# Patient Record
Sex: FEMALE | Race: WHITE | NOT HISPANIC OR LATINO | URBAN - METROPOLITAN AREA
[De-identification: names, ages, dates, MRNs, and addresses within clinical notes are randomized per-mention and may not be internally consistent; named-entity substitution may affect disease eponyms.]

---

## 2023-06-20 ENCOUNTER — EMERGENCY (EMERGENCY)
Facility: HOSPITAL | Age: 53
LOS: 1 days | Discharge: HOME | End: 2023-06-20
Attending: EMERGENCY MEDICINE
Payer: MEDICARE

## 2023-06-20 VITALS
DIASTOLIC BLOOD PRESSURE: 78 MMHG | WEIGHT: 209 LBS | HEART RATE: 76 BPM | TEMPERATURE: 98 F | SYSTOLIC BLOOD PRESSURE: 117 MMHG | OXYGEN SATURATION: 98 % | RESPIRATION RATE: 17 BRPM

## 2023-06-20 LAB
ALBUMIN SERPL ELPH-MCNC: 3.9 G/DL — SIGNIFICANT CHANGE UP (ref 3.3–5.2)
ALP SERPL-CCNC: 82 U/L — SIGNIFICANT CHANGE UP (ref 40–120)
ALT FLD-CCNC: 11 U/L — SIGNIFICANT CHANGE UP
ANION GAP SERPL CALC-SCNC: 13 MMOL/L — SIGNIFICANT CHANGE UP (ref 5–17)
APTT BLD: 35.4 SEC — SIGNIFICANT CHANGE UP (ref 27.5–35.5)
AST SERPL-CCNC: 13 U/L — SIGNIFICANT CHANGE UP
BASOPHILS # BLD AUTO: 0.05 K/UL — SIGNIFICANT CHANGE UP (ref 0–0.2)
BASOPHILS NFR BLD AUTO: 0.7 % — SIGNIFICANT CHANGE UP (ref 0–2)
BILIRUB SERPL-MCNC: 0.2 MG/DL — LOW (ref 0.4–2)
BUN SERPL-MCNC: 8.8 MG/DL — SIGNIFICANT CHANGE UP (ref 8–20)
CALCIUM SERPL-MCNC: 9 MG/DL — SIGNIFICANT CHANGE UP (ref 8.4–10.5)
CHLORIDE SERPL-SCNC: 103 MMOL/L — SIGNIFICANT CHANGE UP (ref 96–108)
CO2 SERPL-SCNC: 23 MMOL/L — SIGNIFICANT CHANGE UP (ref 22–29)
CREAT SERPL-MCNC: 0.57 MG/DL — SIGNIFICANT CHANGE UP (ref 0.5–1.3)
EGFR: 110 ML/MIN/1.73M2 — SIGNIFICANT CHANGE UP
EOSINOPHIL # BLD AUTO: 0.06 K/UL — SIGNIFICANT CHANGE UP (ref 0–0.5)
EOSINOPHIL NFR BLD AUTO: 0.8 % — SIGNIFICANT CHANGE UP (ref 0–6)
GLUCOSE SERPL-MCNC: 95 MG/DL — SIGNIFICANT CHANGE UP (ref 70–99)
HCG SERPL-ACNC: <4 MIU/ML — SIGNIFICANT CHANGE UP
HCT VFR BLD CALC: 36.2 % — SIGNIFICANT CHANGE UP (ref 34.5–45)
HGB BLD-MCNC: 12.3 G/DL — SIGNIFICANT CHANGE UP (ref 11.5–15.5)
IMM GRANULOCYTES NFR BLD AUTO: 0.3 % — SIGNIFICANT CHANGE UP (ref 0–0.9)
INR BLD: 1.14 RATIO — SIGNIFICANT CHANGE UP (ref 0.88–1.16)
LYMPHOCYTES # BLD AUTO: 2.37 K/UL — SIGNIFICANT CHANGE UP (ref 1–3.3)
LYMPHOCYTES # BLD AUTO: 32.7 % — SIGNIFICANT CHANGE UP (ref 13–44)
MCHC RBC-ENTMCNC: 31.1 PG — SIGNIFICANT CHANGE UP (ref 27–34)
MCHC RBC-ENTMCNC: 34 GM/DL — SIGNIFICANT CHANGE UP (ref 32–36)
MCV RBC AUTO: 91.6 FL — SIGNIFICANT CHANGE UP (ref 80–100)
MONOCYTES # BLD AUTO: 0.44 K/UL — SIGNIFICANT CHANGE UP (ref 0–0.9)
MONOCYTES NFR BLD AUTO: 6.1 % — SIGNIFICANT CHANGE UP (ref 2–14)
NEUTROPHILS # BLD AUTO: 4.31 K/UL — SIGNIFICANT CHANGE UP (ref 1.8–7.4)
NEUTROPHILS NFR BLD AUTO: 59.4 % — SIGNIFICANT CHANGE UP (ref 43–77)
NT-PROBNP SERPL-SCNC: 30 PG/ML — SIGNIFICANT CHANGE UP (ref 0–300)
PLATELET # BLD AUTO: 333 K/UL — SIGNIFICANT CHANGE UP (ref 150–400)
POTASSIUM SERPL-MCNC: 3.9 MMOL/L — SIGNIFICANT CHANGE UP (ref 3.5–5.3)
POTASSIUM SERPL-SCNC: 3.9 MMOL/L — SIGNIFICANT CHANGE UP (ref 3.5–5.3)
PROT SERPL-MCNC: 7.3 G/DL — SIGNIFICANT CHANGE UP (ref 6.6–8.7)
PROTHROM AB SERPL-ACNC: 13.2 SEC — SIGNIFICANT CHANGE UP (ref 10.5–13.4)
RBC # BLD: 3.95 M/UL — SIGNIFICANT CHANGE UP (ref 3.8–5.2)
RBC # FLD: 13.3 % — SIGNIFICANT CHANGE UP (ref 10.3–14.5)
SODIUM SERPL-SCNC: 139 MMOL/L — SIGNIFICANT CHANGE UP (ref 135–145)
TROPONIN T SERPL-MCNC: <0.01 NG/ML — SIGNIFICANT CHANGE UP (ref 0–0.06)
WBC # BLD: 7.25 K/UL — SIGNIFICANT CHANGE UP (ref 3.8–10.5)
WBC # FLD AUTO: 7.25 K/UL — SIGNIFICANT CHANGE UP (ref 3.8–10.5)

## 2023-06-20 PROCEDURE — 93005 ELECTROCARDIOGRAM TRACING: CPT

## 2023-06-20 PROCEDURE — 85025 COMPLETE CBC W/AUTO DIFF WBC: CPT

## 2023-06-20 PROCEDURE — 99285 EMERGENCY DEPT VISIT HI MDM: CPT

## 2023-06-20 PROCEDURE — 93010 ELECTROCARDIOGRAM REPORT: CPT

## 2023-06-20 PROCEDURE — 71045 X-RAY EXAM CHEST 1 VIEW: CPT | Mod: 26

## 2023-06-20 PROCEDURE — 84484 ASSAY OF TROPONIN QUANT: CPT

## 2023-06-20 PROCEDURE — 36415 COLL VENOUS BLD VENIPUNCTURE: CPT

## 2023-06-20 PROCEDURE — 84702 CHORIONIC GONADOTROPIN TEST: CPT

## 2023-06-20 PROCEDURE — 71045 X-RAY EXAM CHEST 1 VIEW: CPT

## 2023-06-20 PROCEDURE — 99285 EMERGENCY DEPT VISIT HI MDM: CPT | Mod: 25

## 2023-06-20 PROCEDURE — 80053 COMPREHEN METABOLIC PANEL: CPT

## 2023-06-20 PROCEDURE — 85610 PROTHROMBIN TIME: CPT

## 2023-06-20 PROCEDURE — 83880 ASSAY OF NATRIURETIC PEPTIDE: CPT

## 2023-06-20 PROCEDURE — 85730 THROMBOPLASTIN TIME PARTIAL: CPT

## 2023-06-20 NOTE — ED ADULT NURSE NOTE - NSFALLRISKINTERV_ED_ALL_ED

## 2023-06-20 NOTE — ED PROVIDER NOTE - PATIENT PORTAL LINK FT
You can access the FollowMyHealth Patient Portal offered by [facility] by registering at the following website: [email or web address]. By joining [employer]’s FollowMyHealth portal, you will also be able to view your health information using other applications (apps) compatible with our system.

## 2023-06-20 NOTE — ED PROVIDER NOTE - NSFOLLOWUPINSTRUCTIONS_ED_ALL_ED_FT
1. Return to ED for worsening, progressive or any other concerning symptoms   2. Follow up with your primary care doctor in 2-3days   3. Follow up with vascular specialist 1. Return to ED for worsening, progressive or any other concerning symptoms   2. Follow up with your primary care doctor in 2-3days   3. Follow up with vascular specialist    Chest Pain    Chest pain can be caused by many different conditions which may or may not be dangerous. Causes include heartburn, lung infections, heart attack, blood clot in lungs, skin infections, strain or damage to muscle, cartilage, or bones, etc. In addition to a history and physical examination, an electrocardiogram (ECG) or other lab tests may have been performed to determine the cause of your chest pain. Follow up with your primary care provider or with a cardiologist as instructed.     SEEK IMMEDIATE MEDICAL CARE IF YOU HAVE ANY OF THE FOLLOWING SYMPTOMS: worsening chest pain, coughing up blood, unexplained back/neck/jaw pain, severe abdominal pain, dizziness or lightheadedness, fainting, shortness of breath, sweaty or clammy skin, vomiting, or racing heart beat. These symptoms may represent a serious problem that is an emergency. Do not wait to see if the symptoms will go away. Get medical help right away. Call 911 and do not drive yourself to the hospital.

## 2023-06-20 NOTE — ED PROVIDER NOTE - CROS ED CARDIOVAS ALL NEG
- - - uncomplicated vaginal delivery, uncomplicated postpartum course, discharged home in stable condition

## 2023-06-20 NOTE — ED PROVIDER NOTE - CLINICAL SUMMARY MEDICAL DECISION MAKING FREE TEXT BOX
52 yo female presents with b/l LE swelling, exertional dyspnea, chest pain intermittent for past week. Will check labs, ekg, cxr. Monitor and reassess.

## 2023-06-20 NOTE — ED STATDOCS - PROGRESS NOTE DETAILS
Aashish JORDAN for Attending, Dr. Cleveland: 52 y/o female with PMHx of HTN presents with bilateral foot swelling for 1 week. Reports SOB on exertion. Denies SOB when laying down. Reports swelling developed in both feet at same time. Reports falling 1 month ago and fell again recently. Denies past history of similar symptoms. Denies recent travel or long car rides. Reports ambulating. Reports taking taking HTN meds daily. Will get EKG and patient will be placed in the main ED for complete evaluation by another provider.

## 2023-06-20 NOTE — ED PROVIDER NOTE - ATTENDING WITH...
[employer] Access Code: [email or web address]  Expires: 5/7/2017  7:54 PM    [employer]  A secure, online tool to manage your health information     [employer]’s [employer]® is a secure, online tool that connects you to your personalized health information from the privacy of your home -- day or night - making it very easy for you to manage your healthcare. Once the activation process is completed, you can even access your medical information using the [employer] mirna, which is available for free in the [employer] Mirna store or Google Play store.     [employer] provides the following levels of access (as shown below):   My Chart Features   [facility] Primary Care Doctor [facility]  Specialists [facility]  Urgent  Care Non-[facility]  Primary Care  Doctor   Email your healthcare team securely and privately 24/7 X X X X   Manage appointments: schedule your next appointment; view details of past/upcoming appointments X      Request prescription refills. X      View recent personal medical records, including lab and immunizations X X X X   View health record, including health history, allergies, medications X X X X   Read reports about your outpatient visits, procedures, consult and ER notes X X X X   See your discharge summary, which is a recap of your hospital and/or ER visit that includes your diagnosis, lab results, and care plan. X X       How to register for [employer]:  1. Go to  [email or web address].  2. Click on the Sign Up Now box, which takes you to the New Member Sign Up page. You will need to provide the following information:  a. Enter your [employer] Access Code exactly as it appears at the top of this page. (You will not need to use this code after you’ve completed the sign-up process. If you do not sign up before the expiration date, you must request a new code.)   b. Enter your date of birth.   c. Enter your home email address.   d. Click Submit, and follow the next screen’s instructions.  3. Create a [employer] ID. This will be your [employer]  login ID and cannot be changed, so think of one that is secure and easy to remember.  4. Create a [employer] password. You can change your password at any time.  5. Enter your Password Reset Question and Answer. This can be used at a later time if you forget your password.   6. Enter your e-mail address. This allows you to receive e-mail notifications when new information is available in [employer].  7. Click Sign Up. You can now view your health information.    For assistance activating your [employer] account, call [phone]         Resident

## 2023-06-20 NOTE — ED PROVIDER NOTE - ATTENDING CONTRIBUTION TO CARE
I, Rakel Hendricks, have personally seen and examined this patient. I have fully participated in the care of this patient. I have reviewed all pertinent clinical information, including history, physical exam, plan and the Resident's note and agree except as noted below.       Pt with chronic leg swelling slightly worse this week. no SOB. no orthopnea. no known cardiac/renal histroyr.   NAD, CTAB, RRR, +1 b/l LE pitting edema   labs wnl with normal renal function/liver function and negative BNP. will recommend Follow up with vascular specialist. no concenr for DVT in setting of symmetric leg swelling

## 2023-06-20 NOTE — ED ADULT NURSE NOTE - OBJECTIVE STATEMENT
Pt care acquired at 1300. Pt is A&Ox4 and is resting in bed. Pt arrives to ED c/o lower foot swelling. Pt states that swelling has been worsening over the last week. Pt states she has pain in bilateral ankles. On assessment edema is +1 pitting. Pedal pulses palpable bilateral. Pt denies chest pain. Pt denies shortness of breath. Pt denies significant cardiac history or family history. VSS.

## 2023-06-20 NOTE — ED PROVIDER NOTE - PHYSICAL EXAMINATION
Gen: Well appearing in NAD  Head: NC/AT  Neck: trachea midline  Resp:  No distress, lungs cta b/l  Cardiac: Heart rrr. No murmur. 1+ pitting edema b/l LE from ankle through mid shin.   Abdomen: Soft, nontender, nondistended.   Ext: no deformities  Neuro:  A&O appears non focal  Skin:  Warm and dry as visualized  Psych:  Normal affect and mood

## 2023-06-20 NOTE — ED PROVIDER NOTE - OBJECTIVE STATEMENT
54 yo female history of HTN presents with increased SOB for the past week. She also reports b/l LE swelling. Patient states that she has also been experiencing intermittent chest pain for the past few days. Denies chest pain at present. Patient reports that she does take medication for her HTN, cannot recall the name of the medication at present. Denies fever/chills, cough, sore throat, back pain, abd pain, n/v/d, urinary complaints, focal weakness/numbness/tingling in extremities. Denies smoking/alcohol/drug use.

## 2023-06-20 NOTE — ED PROVIDER NOTE - PROGRESS NOTE DETAILS
Fitz: labs wnl. Disucssed results w patient and need to follow up with primary care physician and cardiologist. Return precautions discussed. Patient understood and comfortable with the plan.

## 2023-06-21 PROBLEM — I10 ESSENTIAL (PRIMARY) HYPERTENSION: Chronic | Status: ACTIVE | Noted: 2023-06-20

## 2023-06-27 ENCOUNTER — APPOINTMENT (OUTPATIENT)
Dept: VASCULAR SURGERY | Facility: CLINIC | Age: 53
End: 2023-06-27
Payer: MEDICARE

## 2023-06-27 VITALS
SYSTOLIC BLOOD PRESSURE: 100 MMHG | WEIGHT: 204 LBS | OXYGEN SATURATION: 97 % | HEIGHT: 65 IN | TEMPERATURE: 96.2 F | BODY MASS INDEX: 33.99 KG/M2 | DIASTOLIC BLOOD PRESSURE: 68 MMHG | RESPIRATION RATE: 16 BRPM | HEART RATE: 85 BPM

## 2023-06-27 DIAGNOSIS — M79.89 OTHER SPECIFIED SOFT TISSUE DISORDERS: ICD-10-CM

## 2023-06-27 PROBLEM — Z00.00 ENCOUNTER FOR PREVENTIVE HEALTH EXAMINATION: Status: ACTIVE | Noted: 2023-06-27

## 2023-06-27 PROCEDURE — 93970 EXTREMITY STUDY: CPT

## 2023-06-27 PROCEDURE — 99203 OFFICE O/P NEW LOW 30 MIN: CPT

## 2023-06-28 NOTE — ASSESSMENT
[FreeTextEntry1] : Patient with swelling of bilateral lower extremities as has been slowly progressive.  Venous ultrasound shows no evidence of DVT.  She has no evidence of varicosities.  I advised her that she is developing lymphedema.  So I advised her to lose weight.  Exercise.  Use of daily compression socks 20 to 30 mmHg.  Leg elevation.  She will return to the office in a few weeks to see if this option has been helpful for her.  If she has persistent swelling then we will discuss possible lymphedema pump acquisition.

## 2023-06-28 NOTE — PHYSICAL EXAM
[Respiratory Effort] : normal respiratory effort [de-identified] : Appears well in no acute distress.  Patient is overweight [FreeTextEntry1] : Triphasic DP PT signals difficult to palpate secondary to edema. [de-identified] : Bilateral lower extremities with edema left worse than right.  The edema involves the toes dorsum of foot ankles and distal calf.

## 2023-06-28 NOTE — HISTORY OF PRESENT ILLNESS
[FreeTextEntry1] : 53-year-old female.  Recently in the ER with progressive swelling of initially left foot and ankle and subsequently her right.  She denies shortness of breath or chest pain.  She reports that the swelling has been present for only a few weeks.  She denies any history of injury to either lower extremity no history of cellulitis no family history of lymphedema.  She has not utilize compression stockings yet.

## 2023-08-07 ENCOUNTER — APPOINTMENT (OUTPATIENT)
Dept: VASCULAR SURGERY | Facility: CLINIC | Age: 53
End: 2023-08-07
Payer: MEDICARE

## 2023-08-07 VITALS
TEMPERATURE: 97.4 F | HEART RATE: 72 BPM | OXYGEN SATURATION: 97 % | HEIGHT: 65 IN | SYSTOLIC BLOOD PRESSURE: 97 MMHG | WEIGHT: 203 LBS | RESPIRATION RATE: 16 BRPM | DIASTOLIC BLOOD PRESSURE: 64 MMHG | BODY MASS INDEX: 33.82 KG/M2

## 2023-08-07 DIAGNOSIS — I89.0 LYMPHEDEMA, NOT ELSEWHERE CLASSIFIED: ICD-10-CM

## 2023-08-07 PROCEDURE — 99213 OFFICE O/P EST LOW 20 MIN: CPT

## 2023-08-08 PROBLEM — I89.0 LYMPH EDEMA: Status: ACTIVE | Noted: 2023-08-08

## 2023-08-08 NOTE — ASSESSMENT
[FreeTextEntry1] : 53-year-old female with progressive lymphedema despite greater than 6 weeks of leg elevation, exercise, use of standard 20 to 30 mm compression stockings.  At this point I believe she will benefit from a lymphedema pump I will start the acquisition patient aware that it can take multiple weeks for lymphedema pump if covered.

## 2023-08-08 NOTE — PHYSICAL EXAM
NURSE ANTICOAGULATION VISIT    2018     Cayla presents to the [facility]  for her management of Warfarin therapy.    (Z51.81,  Z79.01) Encounter for monitoring Coumadin therapy  Plan: INR - POINT OF CARE    (I48.0) AF (paroxysmal atrial fibrillation) (CMS/[facility])  Plan: INR - POINT OF CARE       Status    Patient denies missing any doses or taking extra doses of the anticoagulant. Patient is not at her goal between 2.0-3.0.   Dietary   Patient denies dietary changes.    Reviewed with patient how important it is to be consistent with Vitamin K foods and she verbalized understanding.   Medication   Medication changes were reviewed, including herbals, supplements, and over the counter medications.  Changes were not reported by patient.   Current dosing of the Warfarin was verified and patient has been taking it as prescribed.    Alcohol   Patient denies changes in alcohol consumption.    Patient is aware that alcohol can alter the INR.  Review of Systems   SKIN: Denies any bruising or bleeding. No bruising or bleeding noted when the patient was in the clinic today.   PSYCHOLOGIC: Alert. Oriented x3 to person, place, and time. Patient is self-administering medication.   OTHER: Patient denies noting any blood in stool, urine, gums, or nose.  Plan of Care   Current dose 5 mg mon/wed/sat; 2.5 mg the rest of the days. Per AAC guidelines- patient will start new dose tonight (10% increase):   Anticoagulation Summary  As of 2018    INR goal:   2.0-3.0   TTR:   69.6 % (4.2 y)   INR used for dosin.8! (2018)   Warfarin maintenance plan:   2.5 mg (5 mg x 0.5) every Dangelo, W, ; 5 mg (5 mg x 1) all other days   Weekly warfarin total:   27.5 mg   Plan last modified:   Nichelle Arceo RN (2018)   Next INR check:   12/3/2018   Target end date:   Indefinite    Indications    AF (paroxysmal atrial fibrillation) (CMS/[facility]) [I48.0]  Encounter for monitoring Coumadin therapy  [Z51.81  Z79.01]             Anticoagulation Episode Summary     INR check location:   [facility]    Preferred lab:       Send INR reminders to:   [facility]    Comments:         Anticoagulation Care Providers     Provider Role Specialty Phone number    Epifanio ULYSSES Parsons DO Referring Cardiovascular Disease [phone]    Torres Vivar MD  [facility] [phone]          Nichelle Arceo RN    [Respiratory Effort] : normal respiratory effort [2+] : right 2+ [de-identified] : Appears well in no acute distress. [de-identified] : Bilateral lower extremity edema involving the dorsum of the foot ankle calf and toes.  Evidence of skin thickening/lipodermatosclerosis/hyperkeratosis.

## 2023-08-08 NOTE — HISTORY OF PRESENT ILLNESS
[FreeTextEntry1] : 53-year-old female with bilateral lower extremity lymphedema.  She has been utilizing standard 20 to 30 mm compression stockings, elevation and exercise for greater than 6 weeks continues to have symptoms of bilateral lower extremity swelling albeit has improved.  She is elevating her legs.  She is here for further evaluation for possible lymphatic pump.

## 2025-03-14 ENCOUNTER — OFFICE (OUTPATIENT)
Dept: URBAN - METROPOLITAN AREA CLINIC 94 | Facility: CLINIC | Age: 55
Setting detail: OPHTHALMOLOGY
End: 2025-03-14
Payer: COMMERCIAL

## 2025-03-14 ENCOUNTER — RX ONLY (RX ONLY)
Age: 55
End: 2025-03-14

## 2025-03-14 DIAGNOSIS — H01.001: ICD-10-CM

## 2025-03-14 DIAGNOSIS — H25.13: ICD-10-CM

## 2025-03-14 DIAGNOSIS — H01.002: ICD-10-CM

## 2025-03-14 DIAGNOSIS — H01.004: ICD-10-CM

## 2025-03-14 PROBLEM — H01.005 BLEPHARITIS; RIGHT UPPER LID, RIGHT LOWER LID, LEFT UPPER LID, LEFT LOWER LID: Status: ACTIVE | Noted: 2025-03-14

## 2025-03-14 PROBLEM — H10.45 ALLERGIC CONJUNCTIVITIS: Status: ACTIVE | Noted: 2025-03-14

## 2025-03-14 PROCEDURE — 92004 COMPRE OPH EXAM NEW PT 1/>: CPT | Performed by: PHYSICIAN ASSISTANT

## 2025-03-14 ASSESSMENT — VISUAL ACUITY
OD_BCVA: 20/25
OS_BCVA: 20/25

## 2025-03-14 ASSESSMENT — LID EXAM ASSESSMENTS
OD_BLEPHARITIS: RLL RUL
OS_BLEPHARITIS: LLL LUL

## 2025-03-14 ASSESSMENT — CORNEAL DYSTROPHY - POSTERIOR
OS_POSTERIORDYSTROPHY: FUCHS
OD_POSTERIORDYSTROPHY: FUCHS

## 2025-03-14 ASSESSMENT — TONOMETRY
OS_IOP_MMHG: 20
OD_IOP_MMHG: 21

## 2025-03-14 ASSESSMENT — REFRACTION_AUTOREFRACTION
OS_SPHERE: -0.50
OD_AXIS: 092
OD_SPHERE: -0.25
OS_AXIS: 063
OD_CYLINDER: -0.75
OS_CYLINDER: -0.75

## 2025-03-14 ASSESSMENT — CONFRONTATIONAL VISUAL FIELD TEST (CVF)
OD_FINDINGS: FULL
OS_FINDINGS: FULL

## 2025-03-14 ASSESSMENT — KERATOMETRY
OD_K1POWER_DIOPTERS: 43.75
OS_K1POWER_DIOPTERS: 44.25
OD_AXISANGLE_DEGREES: 006
OS_K2POWER_DIOPTERS: 45.00
OS_AXISANGLE_DEGREES: 134
OD_K2POWER_DIOPTERS: 44.00